# Patient Record
Sex: MALE | Race: WHITE | NOT HISPANIC OR LATINO | Employment: FULL TIME | URBAN - METROPOLITAN AREA
[De-identification: names, ages, dates, MRNs, and addresses within clinical notes are randomized per-mention and may not be internally consistent; named-entity substitution may affect disease eponyms.]

---

## 2023-11-09 ENCOUNTER — OFFICE VISIT (OUTPATIENT)
Dept: URGENT CARE | Facility: CLINIC | Age: 28
End: 2023-11-09
Payer: COMMERCIAL

## 2023-11-09 ENCOUNTER — HOSPITAL ENCOUNTER (EMERGENCY)
Facility: HOSPITAL | Age: 28
Discharge: HOME | End: 2023-11-09
Attending: STUDENT IN AN ORGANIZED HEALTH CARE EDUCATION/TRAINING PROGRAM
Payer: COMMERCIAL

## 2023-11-09 VITALS
WEIGHT: 275 LBS | TEMPERATURE: 100 F | RESPIRATION RATE: 17 BRPM | BODY MASS INDEX: 37.25 KG/M2 | DIASTOLIC BLOOD PRESSURE: 74 MMHG | HEIGHT: 72 IN | SYSTOLIC BLOOD PRESSURE: 123 MMHG | HEART RATE: 84 BPM | OXYGEN SATURATION: 100 %

## 2023-11-09 VITALS
OXYGEN SATURATION: 100 % | HEART RATE: 91 BPM | WEIGHT: 275 LBS | BODY MASS INDEX: 37.25 KG/M2 | TEMPERATURE: 98 F | RESPIRATION RATE: 18 BRPM | SYSTOLIC BLOOD PRESSURE: 140 MMHG | HEIGHT: 72 IN | DIASTOLIC BLOOD PRESSURE: 78 MMHG

## 2023-11-09 DIAGNOSIS — L03.116 CELLULITIS OF LEFT LEG: Primary | ICD-10-CM

## 2023-11-09 DIAGNOSIS — M79.605 PAIN OF LEFT LOWER EXTREMITY: Primary | ICD-10-CM

## 2023-11-09 DIAGNOSIS — M79.89 LEFT LEG SWELLING: ICD-10-CM

## 2023-11-09 DIAGNOSIS — R06.02 SHORTNESS OF BREATH: ICD-10-CM

## 2023-11-09 DIAGNOSIS — M79.605 LEFT LEG PAIN: ICD-10-CM

## 2023-11-09 LAB
ALBUMIN SERPL-MCNC: 4.3 G/DL (ref 3.5–5)
ALBUMIN/GLOB SERPL: 1.2 RATIO (ref 1.1–2)
ALP SERPL-CCNC: 45 UNIT/L (ref 40–150)
ALT SERPL-CCNC: 30 UNIT/L (ref 0–55)
APTT PPP: 31.8 SECONDS (ref 23.4–33.9)
AST SERPL-CCNC: 24 UNIT/L (ref 5–34)
BASOPHILS # BLD AUTO: 0.02 X10(3)/MCL
BASOPHILS NFR BLD AUTO: 0.1 %
BILIRUB SERPL-MCNC: 0.5 MG/DL
BUN SERPL-MCNC: 14.5 MG/DL (ref 8.9–20.6)
CALCIUM SERPL-MCNC: 9.8 MG/DL (ref 8.4–10.2)
CHLORIDE SERPL-SCNC: 100 MMOL/L (ref 98–107)
CO2 SERPL-SCNC: 23 MMOL/L (ref 22–29)
CREAT SERPL-MCNC: 1.04 MG/DL (ref 0.73–1.18)
EOSINOPHIL # BLD AUTO: 0.02 X10(3)/MCL (ref 0–0.9)
EOSINOPHIL NFR BLD AUTO: 0.1 %
ERYTHROCYTE [DISTWIDTH] IN BLOOD BY AUTOMATED COUNT: 11.5 % (ref 11.5–17)
FLUAV AG UPPER RESP QL IA.RAPID: NOT DETECTED
FLUBV AG UPPER RESP QL IA.RAPID: NOT DETECTED
GFR SERPLBLD CREATININE-BSD FMLA CKD-EPI: >60 MLS/MIN/1.73/M2
GLOBULIN SER-MCNC: 3.7 GM/DL (ref 2.4–3.5)
GLUCOSE SERPL-MCNC: 99 MG/DL (ref 74–100)
HCT VFR BLD AUTO: 38.8 % (ref 42–52)
HGB BLD-MCNC: 13.6 G/DL (ref 14–18)
IMM GRANULOCYTES # BLD AUTO: 0.05 X10(3)/MCL (ref 0–0.04)
IMM GRANULOCYTES NFR BLD AUTO: 0.4 %
INR PPP: 1 (ref 2–3)
LYMPHOCYTES # BLD AUTO: 1.61 X10(3)/MCL (ref 0.6–4.6)
LYMPHOCYTES NFR BLD AUTO: 11.9 %
MCH RBC QN AUTO: 30.6 PG (ref 27–31)
MCHC RBC AUTO-ENTMCNC: 35.1 G/DL (ref 33–36)
MCV RBC AUTO: 87.2 FL (ref 80–94)
MONOCYTES # BLD AUTO: 0.84 X10(3)/MCL (ref 0.1–1.3)
MONOCYTES NFR BLD AUTO: 6.2 %
NEUTROPHILS # BLD AUTO: 10.94 X10(3)/MCL (ref 2.1–9.2)
NEUTROPHILS NFR BLD AUTO: 81.3 %
NRBC BLD AUTO-RTO: 0 %
PLATELET # BLD AUTO: 249 X10(3)/MCL (ref 130–400)
PMV BLD AUTO: 10 FL (ref 7.4–10.4)
POTASSIUM SERPL-SCNC: 4.4 MMOL/L (ref 3.5–5.1)
PROT SERPL-MCNC: 8 GM/DL (ref 6.4–8.3)
PROTHROMBIN TIME: 13.6 SECONDS (ref 11.7–14.5)
RBC # BLD AUTO: 4.45 X10(6)/MCL (ref 4.7–6.1)
SARS-COV-2 RNA RESP QL NAA+PROBE: NOT DETECTED
SODIUM SERPL-SCNC: 135 MMOL/L (ref 136–145)
TROPONIN I SERPL-MCNC: 0.02 NG/ML (ref 0–0.04)
WBC # SPEC AUTO: 13.48 X10(3)/MCL (ref 4.5–11.5)

## 2023-11-09 PROCEDURE — 0240U COVID/FLU A&B PCR: CPT | Performed by: STUDENT IN AN ORGANIZED HEALTH CARE EDUCATION/TRAINING PROGRAM

## 2023-11-09 PROCEDURE — 93005 ELECTROCARDIOGRAM TRACING: CPT

## 2023-11-09 PROCEDURE — 85025 COMPLETE CBC W/AUTO DIFF WBC: CPT | Performed by: STUDENT IN AN ORGANIZED HEALTH CARE EDUCATION/TRAINING PROGRAM

## 2023-11-09 PROCEDURE — 99285 EMERGENCY DEPT VISIT HI MDM: CPT | Mod: 25

## 2023-11-09 PROCEDURE — 84484 ASSAY OF TROPONIN QUANT: CPT | Performed by: STUDENT IN AN ORGANIZED HEALTH CARE EDUCATION/TRAINING PROGRAM

## 2023-11-09 PROCEDURE — 85610 PROTHROMBIN TIME: CPT | Performed by: STUDENT IN AN ORGANIZED HEALTH CARE EDUCATION/TRAINING PROGRAM

## 2023-11-09 PROCEDURE — 80053 COMPREHEN METABOLIC PANEL: CPT | Performed by: STUDENT IN AN ORGANIZED HEALTH CARE EDUCATION/TRAINING PROGRAM

## 2023-11-09 PROCEDURE — 99203 OFFICE O/P NEW LOW 30 MIN: CPT | Mod: ,,, | Performed by: FAMILY MEDICINE

## 2023-11-09 PROCEDURE — 25000003 PHARM REV CODE 250: Performed by: STUDENT IN AN ORGANIZED HEALTH CARE EDUCATION/TRAINING PROGRAM

## 2023-11-09 PROCEDURE — 85730 THROMBOPLASTIN TIME PARTIAL: CPT | Performed by: STUDENT IN AN ORGANIZED HEALTH CARE EDUCATION/TRAINING PROGRAM

## 2023-11-09 PROCEDURE — 99203 PR OFFICE/OUTPT VISIT, NEW, LEVL III, 30-44 MIN: ICD-10-PCS | Mod: ,,, | Performed by: FAMILY MEDICINE

## 2023-11-09 RX ORDER — HYDROCODONE BITARTRATE AND ACETAMINOPHEN 5; 325 MG/1; MG/1
1 TABLET ORAL EVERY 6 HOURS PRN
Qty: 12 TABLET | Refills: 0 | Status: SHIPPED | OUTPATIENT
Start: 2023-11-09

## 2023-11-09 RX ORDER — HYDROCODONE BITARTRATE AND ACETAMINOPHEN 5; 325 MG/1; MG/1
1 TABLET ORAL
Status: COMPLETED | OUTPATIENT
Start: 2023-11-09 | End: 2023-11-09

## 2023-11-09 RX ORDER — DICLOFENAC SODIUM 75 MG/1
75 TABLET, DELAYED RELEASE ORAL 2 TIMES DAILY PRN
Qty: 20 TABLET | Refills: 0 | Status: SHIPPED | OUTPATIENT
Start: 2023-11-09

## 2023-11-09 RX ORDER — CEFUROXIME AXETIL 500 MG/1
500 TABLET ORAL EVERY 12 HOURS
Qty: 14 TABLET | Refills: 0 | Status: SHIPPED | OUTPATIENT
Start: 2023-11-09 | End: 2023-11-16

## 2023-11-09 RX ORDER — ACETAMINOPHEN 500 MG
1000 TABLET ORAL
Status: DISCONTINUED | OUTPATIENT
Start: 2023-11-09 | End: 2023-11-09

## 2023-11-09 RX ADMIN — HYDROCODONE BITARTRATE AND ACETAMINOPHEN 1 TABLET: 5; 325 TABLET ORAL at 02:11

## 2023-11-09 NOTE — PROGRESS NOTES
Subjective:      Patient ID: Pete Cohen is a 28 y.o. male.    Vitals:  height is 6' (1.829 m) and weight is 124.7 kg (275 lb). His temperature is 99.8 °F (37.7 °C). His blood pressure is 123/74 and his pulse is 84. His respiration is 17 and oxygen saturation is 100%.     Chief Complaint: shin pain  ( Patient is a 28 y.o. male who presents to urgent care with complaints of fever 99.8 and congestion with left shin pain that shoots into ragini that presents redness and throbbing and a lump that suddenly appeared with no trauma or fall since yesterday. Pain level a 6-7. Alleviating factors include OTC medications with no relief. Patient denies sore throat, n/v/d, cough. )      28-year-old male presents to clinic complaining of left lower leg pain that abruptly began yesterday.  Denies any trauma injury or fall to the area.  States the area is red and tender to touch.  States the pain radiates up the inside of the leg into the groin area.  Denies any abrasions or cuts to the area.  States when standing the leg will throb and he has difficulty walking.  Some alleviation of pain when lying down.  Patient is a nonsmoker.  Denies any shortness a breath.  No recent travel.        Constitution: Negative.   HENT: Negative.     Neck: neck negative.   Cardiovascular: Negative.    Eyes: Negative.    Respiratory: Negative.     Gastrointestinal: Negative.    Genitourinary: Negative.    Musculoskeletal: Negative.  Positive for pain.   Skin: Negative.  Positive for erythema (there is erythema warmth and swelling of the left lower leg below the knee.  Tenderness to palpation popliteal fossa.  Calf is nontender.  Left calf is 1 cm larger than the right calf.).   Allergic/Immunologic: Negative.    Neurological: Negative.    Hematologic/Lymphatic: Negative.       Objective:     Physical Exam   Constitutional: He is oriented to person, place, and time. He appears distressed (Mild distress secondary to leg pain).   Pulmonary/Chest: Effort  normal.   Abdominal: Normal appearance.   Neurological: He is alert and oriented to person, place, and time.   Skin: erythema (there is erythema warmth and swelling of the left lower leg below the knee.  Tenderness to palpation popliteal fossa.  Calf is nontender.  Left calf is 1 cm larger than the right calf.)   Psychiatric: His behavior is normal. Mood, judgment and thought content normal.   Vitals reviewed.         Previous History      Review of patient's allergies indicates:  No Known Allergies    History reviewed. No pertinent past medical history.  No current outpatient medications  History reviewed. No pertinent surgical history.  Family History   Problem Relation Age of Onset    Heart murmur Brother        Social History     Tobacco Use    Smoking status: Never     Passive exposure: Never    Smokeless tobacco: Never   Substance Use Topics    Alcohol use: Yes     Comment: 1 time a month    Drug use: Never        Physical Exam      Vital Signs Reviewed   /74   Pulse 84   Temp 99.8 °F (37.7 °C)   Resp 17   Ht 6' (1.829 m)   Wt 124.7 kg (275 lb)   SpO2 100%   BMI 37.30 kg/m²        Procedures    Procedures     Labs   No results found for this or any previous visit.    Assessment:     1. Pain of left lower extremity        Plan:   As we discussed, it is recommended that you present to the ER now for further evaluation to prevent a delay in care.     Ddx:  Cellulitis, abscess, DVT, thrombophlebitis    Needs Ultrasound    Pain of left lower extremity

## 2023-11-09 NOTE — PROGRESS NOTES
Subjective:      Patient ID: Pete Cohen is a 28 y.o. male.    Vitals:  height is 6' (1.829 m) and weight is 124.7 kg (275 lb). His temperature is 99.8 °F (37.7 °C). His blood pressure is 123/74 and his pulse is 84. His respiration is 17 and oxygen saturation is 100%.     Chief Complaint: shin pain  ( Patient is a 28 y.o. male who presents to urgent care with complaints of fever 99.8 and congestion with left shin pain that shoots into ragini that presents redness and throbbing and a lump that suddenly appeared with no trauma or fall since yesterday. Pain level a 6-7. Alleviating factors include OTC medications with no relief. Patient denies sore throat, n/v/d, cough. )     Patient is a 28 y.o. male who presents to urgent care with complaints of fever 99.8 and congestion with left shin pain that shoots into ragini that presents redness and throbbing and a lump that suddenly appeared with no trauma or fall since yesterday. Pain level a 6-7. Alleviating factors include OTC medications with no relief. Patient denies sore throat, n/v/d, cough.   ROS   Objective:     Physical Exam    Assessment:     No diagnosis found.    Plan:       There are no diagnoses linked to this encounter.

## 2023-11-09 NOTE — ED PROVIDER NOTES
Encounter Date: 11/9/2023       History     Chief Complaint   Patient presents with    Leg Pain     Pt to er c/o pain, redness and bruising to left lower leg, denies injury.     HPI    28-year-old male with no stated past history presents emergency department rule out a DVT to his left lower leg.  Patient states that 4 days ago he started having flu-like symptoms.  That resolved in 2 days.  States that he yesterday had mild pain to his left lower leg.  States he woke up this morning with severe pain.  States he noticed bruising to his anterior shin and went to urgent care.  They informed him that he likely has a cellulitis but they are concerned that he has a DVT.  He denies any trauma or fall.  Denies a cough.    Review of patient's allergies indicates:  No Known Allergies  No past medical history on file.  No past surgical history on file.  Family History   Problem Relation Age of Onset    Heart murmur Brother      Social History     Tobacco Use    Smoking status: Never     Passive exposure: Never    Smokeless tobacco: Never   Substance Use Topics    Alcohol use: Yes     Comment: 1 time a month    Drug use: Never     Review of Systems   Constitutional:  Negative for fever.   Respiratory:  Negative for cough and shortness of breath.    Cardiovascular:  Positive for leg swelling. Negative for chest pain.   Gastrointestinal:  Negative for abdominal pain, constipation, diarrhea, nausea and vomiting.   Neurological:  Negative for headaches.   All other systems reviewed and are negative.      Physical Exam     Initial Vitals [11/09/23 1309]   BP Pulse Resp Temp SpO2   (!) 140/78 91 20 97.9 °F (36.6 °C) 100 %      MAP       --         Physical Exam    Nursing note and vitals reviewed.  Constitutional: He appears well-developed and well-nourished. No distress.   Cardiovascular:  Normal rate and regular rhythm.           Pulmonary/Chest: Breath sounds normal. No respiratory distress. He has no wheezes. He has no rhonchi.  He exhibits no tenderness.   Abdominal: Abdomen is soft. There is no abdominal tenderness.   Musculoskeletal:         General: Tenderness (tenderness generally to the left lower leg.  There is ecchymoses to the left anterior shin) present. Normal range of motion.     Neurological: He is alert and oriented to person, place, and time.   Skin: Skin is warm. Capillary refill takes less than 2 seconds.         ED Course   Procedures  Labs Reviewed   COMPREHENSIVE METABOLIC PANEL - Abnormal; Notable for the following components:       Result Value    Sodium Level 135 (*)     Globulin 3.7 (*)     All other components within normal limits   CBC WITH DIFFERENTIAL - Abnormal; Notable for the following components:    WBC 13.48 (*)     RBC 4.45 (*)     Hgb 13.6 (*)     Hct 38.8 (*)     Neut # 10.94 (*)     IG# 0.05 (*)     All other components within normal limits   COVID/FLU A&B PCR - Normal    Narrative:     The Xpert Xpress SARS-CoV-2/FLU/RSV plus is a rapid, multiplexed real-time PCR test intended for the simultaneous qualitative detection and differentiation of SARS-CoV-2, Influenza A, Influenza B, and respiratory syncytial virus (RSV) viral RNA in either nasopharyngeal swab or nasal swab specimens.         TROPONIN I - Normal   CBC W/ AUTO DIFFERENTIAL    Narrative:     The following orders were created for panel order CBC auto differential.  Procedure                               Abnormality         Status                     ---------                               -----------         ------                     CBC with Differential[[id number]]       Abnormal            Final result                 Please view results for these tests on the individual orders.   APTT   PROTIME-INR     EKG Readings: (Independently Interpreted)   Initial Reading: No STEMI. Rhythm: Normal Sinus Rhythm. Heart Rate: 93. Ectopy: No Ectopy. Conduction: Normal. ST Segments: Normal ST Segments. T Waves: Normal. Clinical Impression: Normal Sinus  Rhythm       Imaging Results              US Lower Extremity Veins Left (Final result)  Result time 11/09/23 14:21:28      Final result by Tristin Díaz MD (11/09/23 14:21:28)                   Impression:      Negative exam for left lower extremity thrombus.      Electronically signed by: Tristin Díaz  Date:    11/09/2023  Time:    14:21               Narrative:    EXAMINATION:  US LOWER EXTREMITY VEINS LEFT    CLINICAL HISTORY:  Other specified soft tissue disorders    COMPARISON:  None    FINDINGS:  Sonographic images with color and spectral analysis were obtained of the left lower extremity venous system.    The imaged left lower extremity veins demonstrate normal flow, compressibility, and augmentation without evidence of thrombus.  There is a prominent but otherwise morphologically normal left inguinal lymph node which may be reactive.                                       X-Ray Tibia Fibula 2 View Left (Final result)  Result time 11/09/23 13:52:09      Final result by Prem Villavicencio MD (11/09/23 13:52:09)                   Impression:      No acute osseous abnormality, fracture, or dislocation.    There is no significant degenerative change.      Electronically signed by: Prem Villavicencio  Date:    11/09/2023  Time:    13:52               Narrative:    EXAMINATION:  XR TIBIA FIBULA 2 VIEW LEFT    CLINICAL HISTORY:  Pain in left leg    TECHNIQUE:  Two views of the left tibia and fibula.    COMPARISON:  No prior imaging available for comparison    FINDINGS:  There is no acute fracture, subluxation or dislocation.    Joints and interspaces appear maintained.    Osseous structures show normal bone mineral density.    Soft tissues are unremarkable.    There are no radiopaque foreign bodies.                                       X-Ray Chest 1 View (Final result)  Result time 11/09/23 13:48:23      Final result by Prem Villavicencio MD (11/09/23 13:48:23)                   Impression:      No acute  cardiopulmonary process.      Electronically signed by: Prem Villavicencio  Date:    11/09/2023  Time:    13:48               Narrative:    EXAMINATION:  XR CHEST 1 VIEW    CLINICAL HISTORY:  Shortness of breath    TECHNIQUE:  Single view of the chest    COMPARISON:  No prior imaging available for comparison.    FINDINGS:  No focal opacification, pleural effusion, or pneumothorax.    The cardiomediastinal silhouette is within normal limits.    No acute osseous abnormality.                                       Medications   HYDROcodone-acetaminophen 5-325 mg per tablet 1 tablet (has no administration in time range)     Medical Decision Making  differential diagnosis  DVT, PE, contusion, fracture, cellulitis,  as well as multiple other possible etiologies      Amount and/or Complexity of Data Reviewed  Labs: ordered. Decision-making details documented in ED Course.  Radiology: ordered. Decision-making details documented in ED Course.    Risk  Prescription drug management.  Risk Details: Consider ordering a D-dimer although patient has a bruise to the anterior shin.  This will make the lab abnormal.  His vital signs are normal.  No tachypnea, no pain with deep breathing.  No indication for due in a PE study at this time.  Ultrasound of left leg negative.  Which is cellulitis.  ER precautions given               ED Course as of 11/09/23 1448   u Nov 09, 2023   1354 X-Ray Chest 1 View  Lungs clear   [BS]   1411 Sodium(!): 135 [BS]   1411 Potassium: 4.4 [BS]   1411 Chloride: 100 [BS]   1411 CO2: 23 [BS]   1411 Glucose: 99 [BS]   1411 BUN: 14.5 [BS]   1411 Creatinine: 1.04 [BS]   1414 Troponin I: 0.018 [BS]   1448 WBC(!): 13.48 [BS]   1448 Hemoglobin(!): 13.6 [BS]   1448 Hematocrit(!): 38.8 [BS]   1448 Platelet Count: 249 [BS]      ED Course User Index  [BS] Hao Cox MD                    Clinical Impression:   Final diagnoses:  [R06.02] Shortness of breath  [M79.89] Left leg swelling  [M79.605] Left leg  pain  [L03.116] Cellulitis of left leg (Primary)        ED Disposition Condition    Discharge Stable          ED Prescriptions       Medication Sig Dispense Start Date End Date Auth. Provider    cefUROXime (CEFTIN) 500 MG tablet Take 1 tablet (500 mg total) by mouth every 12 (twelve) hours. for 7 days 14 tablet 11/9/2023 11/16/2023 Hao Cox MD    HYDROcodone-acetaminophen (NORCO) 5-325 mg per tablet Take 1 tablet by mouth every 6 (six) hours as needed. 12 tablet 11/9/2023 -- Hao Cox MD    diclofenac (VOLTAREN) 75 MG EC tablet Take 1 tablet (75 mg total) by mouth 2 (two) times daily as needed. 20 tablet 11/9/2023 -- Hao Cox MD          Follow-up Information       Follow up With Specialties Details Why Contact Info    [facility] - Emergency Dept Emergency Medicine Go to  If symptoms worsen [id number]  [facility] [id number] [phone]             Hao Cox MD  11/09/23 9833

## 2023-11-09 NOTE — PROGRESS NOTES
Subjective:      Patient ID: Pete Cohen is a 28 y.o. male.    Vitals:  height is 6' (1.829 m) and weight is 124.7 kg (275 lb). His temperature is 99.8 °F (37.7 °C). His blood pressure is 123/74 and his pulse is 84. His respiration is 17 and oxygen saturation is 100%.     Chief Complaint: shin pain  ( Patient is a 28 y.o. male who presents to urgent care with complaints of fever 99.8 and congestion with left shin pain that shoots into ragini that presents redness and throbbing and a lump that suddenly appeared with no trauma or fall since yesterday. Pain level a 6-7. Alleviating factors include OTC medications with no relief. Patient denies sore throat, n/v/d, cough. )     Patient is a 28 y.o. male who presents to urgent care with complaints of fever 99.8 and congestion with left shin pain that shoots into ragini that presents redness and throbbing and a lump that suddenly appeared with no trauma or fall since yesterday. Pain level a 6-7. Alleviating factors include OTC medications with no relief. Patient denies sore   ROS   Objective:     Physical Exam    Assessment:     No diagnosis found.    Plan:       There are no diagnoses linked to this encounter.